# Patient Record
Sex: FEMALE | Race: BLACK OR AFRICAN AMERICAN | NOT HISPANIC OR LATINO | Employment: STUDENT | URBAN - METROPOLITAN AREA
[De-identification: names, ages, dates, MRNs, and addresses within clinical notes are randomized per-mention and may not be internally consistent; named-entity substitution may affect disease eponyms.]

---

## 2017-06-20 ENCOUNTER — HOSPITAL ENCOUNTER (EMERGENCY)
Facility: OTHER | Age: 22
Discharge: HOME | End: 2017-06-20
Attending: EMERGENCY MEDICINE
Payer: MEDICAID

## 2017-06-20 VITALS
WEIGHT: 197.06 LBS | HEART RATE: 89 BPM | DIASTOLIC BLOOD PRESSURE: 75 MMHG | SYSTOLIC BLOOD PRESSURE: 123 MMHG | RESPIRATION RATE: 20 BRPM | OXYGEN SATURATION: 95 % | BODY MASS INDEX: 36.26 KG/M2 | HEIGHT: 62 IN | TEMPERATURE: 98 F

## 2017-06-20 DIAGNOSIS — H61.21 HEARING LOSS OF RIGHT EAR DUE TO CERUMEN IMPACTION: Primary | ICD-10-CM

## 2017-06-20 LAB
B-HCG UR QL: NEGATIVE
CTP QC/QA: YES

## 2017-06-20 PROCEDURE — 81025 URINE PREGNANCY TEST: CPT | Performed by: EMERGENCY MEDICINE

## 2017-06-20 PROCEDURE — 99283 EMERGENCY DEPT VISIT LOW MDM: CPT | Mod: 25

## 2017-06-20 NOTE — ED TRIAGE NOTES
Pt presents to ED with c/o right ear pain x 2 weeks, reports pain radiating to cheek now. Pt denies fever/chills at home. No other complaints. Pt AAO x3.

## 2017-06-20 NOTE — ED PROVIDER NOTES
"Encounter Date: 6/20/2017    SCRIBE #1 NOTE: I, Montse Harrell, am scribing for, and in the presence of,  Dr. Mcmillan. I have scribed the entire note.       History     Chief Complaint   Patient presents with    Otalgia     stopped up x 2 wks, can't unstop it anymore by putting her finger in it     Review of patient's allergies indicates:   Allergen Reactions    Shellfish containing products Hives     Time seen by provider: 1:23 AM    This is a 22 y.o. female who presents with complaint of constant R ear pain that began 1 week ago. She states "it feels stopped up." She notes associated R sided dental discomfort. She has applied an earwax removal cream with no improvement of symptoms. She denies recent history of ear infections.       The history is provided by the patient.     History reviewed. No pertinent past medical history.  History reviewed. No pertinent surgical history.  History reviewed. No pertinent family history.  Social History   Substance Use Topics    Smoking status: Current Every Day Smoker     Packs/day: 0.50     Types: Cigarettes    Smokeless tobacco: Never Used    Alcohol use Yes      Comment: socially     Review of Systems   Constitutional: Negative for diaphoresis and fever.   HENT: Positive for ear pain (R). Negative for congestion and sore throat.         R sided dental discomfort.   Eyes: Negative for pain.   Respiratory: Negative for cough and shortness of breath.    Cardiovascular: Negative for chest pain and leg swelling.   Gastrointestinal: Negative for abdominal pain, constipation, diarrhea, nausea and vomiting.   Genitourinary: Negative for dysuria and hematuria.   Musculoskeletal: Negative for myalgias.   Skin: Negative for color change and rash.   Neurological: Negative for syncope and headaches.   Psychiatric/Behavioral: Negative for behavioral problems and confusion.       Physical Exam     Initial Vitals [06/20/17 0110]   BP Pulse Resp Temp SpO2   123/75 89 20 98.2 °F (36.8 °C) 95 " %     Physical Exam    Nursing note and vitals reviewed.  Constitutional: She appears well-developed and well-nourished. She is not diaphoretic. No distress.   HENT:   Head: Normocephalic and atraumatic.   Mouth/Throat: Oropharynx is clear and moist.   R ear: Cerumen impaction.  L ear: Normal.  No visible dental abscess or caries.    Eyes: Conjunctivae are normal. Pupils are equal, round, and reactive to light.   Neck: Normal range of motion. Neck supple.   Cardiovascular: Normal rate, regular rhythm and normal heart sounds. Exam reveals no gallop and no friction rub.    No murmur heard.  Pulmonary/Chest: Breath sounds normal. No respiratory distress. She has no wheezes. She has no rhonchi. She has no rales.   Abdominal: Soft. Bowel sounds are normal. There is no tenderness.   Musculoskeletal: Normal range of motion. She exhibits no edema or tenderness.   Neurological: She is alert and oriented to person, place, and time. She has normal strength. No sensory deficit.   Skin: Skin is warm and dry.   Psychiatric: She has a normal mood and affect. Her behavior is normal.         ED Course   Procedures  Labs Reviewed   POCT URINE PREGNANCY     Imaging Results    None                 Medical Decision Making:   Clinical Tests:   Lab Tests: Ordered and Reviewed    Additional MDM:   Comments: 22-year-old female presents complaining of decreased hearing right.  On exam she has remained impaction.  Patient was given the option of having it cleaned in the emergency department versus following up with ENT.  She chose to follow-up in clinic to have it cleaned.  She was given information to ENT and discharged in stable condition..          Scribe Attestation:   Scribe #1: I performed the above scribed service and the documentation accurately describes the services I performed. I attest to the accuracy of the note.    Attending Attestation:           Physician Attestation for Scribe:  Physician Attestation Statement for Scribe #1:  I, Dr. Mcmillan, reviewed documentation, as scribed by Montse Harrell in my presence, and it is both accurate and complete.                 ED Course     Clinical Impression:     1. Hearing loss of right ear due to cerumen impaction              Malu Mcmillan MD  06/20/17 0259

## 2017-06-29 ENCOUNTER — HOSPITAL ENCOUNTER (EMERGENCY)
Facility: OTHER | Age: 22
Discharge: HOME | End: 2017-06-29
Attending: EMERGENCY MEDICINE
Payer: MEDICARE

## 2017-06-29 VITALS
RESPIRATION RATE: 14 BRPM | DIASTOLIC BLOOD PRESSURE: 84 MMHG | HEART RATE: 76 BPM | OXYGEN SATURATION: 99 % | TEMPERATURE: 99 F | SYSTOLIC BLOOD PRESSURE: 124 MMHG | WEIGHT: 190 LBS | HEIGHT: 62 IN | BODY MASS INDEX: 34.96 KG/M2

## 2017-06-29 DIAGNOSIS — N39.0 URINARY TRACT INFECTION WITH HEMATURIA, SITE UNSPECIFIED: Primary | ICD-10-CM

## 2017-06-29 DIAGNOSIS — J06.9 VIRAL URI WITH COUGH: ICD-10-CM

## 2017-06-29 DIAGNOSIS — R31.9 URINARY TRACT INFECTION WITH HEMATURIA, SITE UNSPECIFIED: Primary | ICD-10-CM

## 2017-06-29 LAB
B-HCG UR QL: NEGATIVE
BACTERIA #/AREA URNS HPF: ABNORMAL /HPF
BILIRUB UR QL STRIP: NEGATIVE
CLARITY UR: ABNORMAL
COLOR UR: YELLOW
CTP QC/QA: YES
GLUCOSE UR QL STRIP: NEGATIVE
HGB UR QL STRIP: ABNORMAL
KETONES UR QL STRIP: NEGATIVE
LEUKOCYTE ESTERASE UR QL STRIP: ABNORMAL
MICROSCOPIC COMMENT: ABNORMAL
NITRITE UR QL STRIP: NEGATIVE
PH UR STRIP: 6 [PH] (ref 5–8)
PROT UR QL STRIP: NEGATIVE
RBC #/AREA URNS HPF: 20 /HPF (ref 0–4)
SP GR UR STRIP: 1.02 (ref 1–1.03)
SQUAMOUS #/AREA URNS HPF: 2 /HPF
URN SPEC COLLECT METH UR: ABNORMAL
UROBILINOGEN UR STRIP-ACNC: NEGATIVE EU/DL
WBC #/AREA URNS HPF: >100 /HPF (ref 0–5)

## 2017-06-29 PROCEDURE — 25000003 PHARM REV CODE 250: Performed by: EMERGENCY MEDICINE

## 2017-06-29 PROCEDURE — 81025 URINE PREGNANCY TEST: CPT | Performed by: EMERGENCY MEDICINE

## 2017-06-29 PROCEDURE — 81000 URINALYSIS NONAUTO W/SCOPE: CPT

## 2017-06-29 PROCEDURE — 99283 EMERGENCY DEPT VISIT LOW MDM: CPT

## 2017-06-29 RX ORDER — SULFAMETHOXAZOLE AND TRIMETHOPRIM 800; 160 MG/1; MG/1
1 TABLET ORAL
Status: COMPLETED | OUTPATIENT
Start: 2017-06-29 | End: 2017-06-29

## 2017-06-29 RX ORDER — SULFAMETHOXAZOLE AND TRIMETHOPRIM 800; 160 MG/1; MG/1
1 TABLET ORAL 2 TIMES DAILY
Qty: 6 TABLET | Refills: 0 | Status: SHIPPED | OUTPATIENT
Start: 2017-06-29 | End: 2017-07-02

## 2017-06-29 RX ADMIN — SULFAMETHOXAZOLE AND TRIMETHOPRIM 1 TABLET: 800; 160 TABLET ORAL at 08:06

## 2017-06-30 NOTE — ED PROVIDER NOTES
"Encounter Date: 6/29/2017    SCRIBE #1 NOTE: I, Obi Garcia, am scribing for, and in the presence of,  Dr. Topete. I have scribed the entire note.       History     Chief Complaint   Patient presents with    Dysuria     Patient c/o dysuria for 1 week.  Patient denies vaginal issues, abd pain, flank pain.      Time seen by provider: 7:19 PM    This is a 22 y.o. female who presents with complaint of "tingling" after urinating which started 1 week ago. She notes accompanying urinary frequency and urgency. She has had "a cold" with congestion, cough and HA. She denies abdominal pain, back pain, nausea, vomiting, fever, vaginal bleeding and vaginal discharge. She denies smoking and drug use but admits to occasional drinking.      The history is provided by the patient.     Review of patient's allergies indicates:   Allergen Reactions    Shellfish containing products Hives     History reviewed. No pertinent past medical history.  History reviewed. No pertinent surgical history.  History reviewed. No pertinent family history.  Social History   Substance Use Topics    Smoking status: Current Every Day Smoker     Packs/day: 0.50     Types: Cigarettes    Smokeless tobacco: Never Used    Alcohol use Yes      Comment: socially     Review of Systems   Constitutional: Negative for chills, diaphoresis and fever.   HENT: Positive for congestion. Negative for sore throat.    Eyes: Negative for pain.   Respiratory: Positive for cough. Negative for shortness of breath.    Cardiovascular: Negative for chest pain.   Gastrointestinal: Negative for diarrhea, nausea and vomiting.   Genitourinary: Positive for difficulty urinating, frequency and urgency.        "tingling" after urination.    Musculoskeletal: Negative for back pain and myalgias.   Skin: Negative for color change.   Neurological: Positive for headaches. Negative for light-headedness.   Psychiatric/Behavioral: Negative for behavioral problems and confusion. "       Physical Exam     Initial Vitals [06/29/17 1846]   BP Pulse Resp Temp SpO2   131/74 94 16 98.6 °F (37 °C) 96 %      MAP       93         Physical Exam    Nursing note and vitals reviewed.  Constitutional: She appears well-developed and well-nourished. She is not diaphoretic. No distress.   HENT:   Head: Normocephalic and atraumatic.   Mouth/Throat: Oropharynx is clear and moist.   Eyes: Conjunctivae are normal. Pupils are equal, round, and reactive to light. Right eye exhibits no discharge. Left eye exhibits no discharge.   Neck: Normal range of motion. Neck supple.   Cardiovascular: Normal rate, regular rhythm, normal heart sounds and intact distal pulses. Exam reveals no gallop and no friction rub.    No murmur heard.  Pulmonary/Chest: Breath sounds normal. No respiratory distress. She has no wheezes. She has no rhonchi. She has no rales.   Abdominal: Soft. Bowel sounds are normal. She exhibits no distension. There is no tenderness. There is no rebound, no guarding and no CVA tenderness.   Musculoskeletal: Normal range of motion. She exhibits no edema or tenderness.   Neurological: She is alert and oriented to person, place, and time. She has normal strength. No sensory deficit.   Skin: Skin is warm and dry. No rash and no abscess noted. No erythema. No pallor.   Psychiatric: She has a normal mood and affect. Her behavior is normal. Judgment and thought content normal.         ED Course   Procedures  Labs Reviewed   URINALYSIS - Abnormal; Notable for the following:        Result Value    Appearance, UA Hazy (*)     Occult Blood UA 1+ (*)     Leukocytes, UA 3+ (*)     All other components within normal limits   URINALYSIS MICROSCOPIC - Abnormal; Notable for the following:     RBC, UA 20 (*)     WBC, UA >100 (*)     Bacteria, UA Few (*)     All other components within normal limits   POCT URINE PREGNANCY             Medical Decision Making:   Clinical Tests:   Lab Tests: Ordered and Reviewed  ED  Management:  Urgent evaluation a 22-year-old female with complaint of urinary symptoms times days.  Vital signs are benign, afebrile.  There is no CVA or abdominal tenderness, I do not suspect pyelonephritis.  Urinalysis is consistent with UTI.  She will be treated with three-day course of Bactrim, given first dose here.  Additionally, she complained of URI type symptoms with cough.  There is no associated abnormal vital sign, chest pain or shortness of breath and I do not suspect pneumonia or think imaging is warranted.  She was discharged in good condition and encouraged close follow-up with PCP or return for new or worsening symptoms.            Scribe Attestation:   Scribe #1: I performed the above scribed service and the documentation accurately describes the services I performed. I attest to the accuracy of the note.    Attending Attestation:           Physician Attestation for Scribe:  Physician Attestation Statement for Scribe #1: I, Dr. Topete, reviewed documentation, as scribed by Obi Garcia in my presence, and it is both accurate and complete.                 ED Course     Clinical Impression:     1. Urinary tract infection with hematuria, site unspecified    2. Viral URI with cough                                Beth Topete MD  06/30/17 7588

## 2017-06-30 NOTE — ED NOTES
Pt presents to the ED with c/o dysuria x1 week, pt reports she has had UTI in the past and feels like this is similar in symptoms. Pt appears non toxic and in no signs of acute distress.

## 2017-08-13 ENCOUNTER — HOSPITAL ENCOUNTER (EMERGENCY)
Facility: OTHER | Age: 22
Discharge: HOME | End: 2017-08-13
Attending: EMERGENCY MEDICINE
Payer: MEDICAID

## 2017-08-13 VITALS
TEMPERATURE: 98 F | SYSTOLIC BLOOD PRESSURE: 145 MMHG | BODY MASS INDEX: 34.96 KG/M2 | DIASTOLIC BLOOD PRESSURE: 90 MMHG | WEIGHT: 190 LBS | RESPIRATION RATE: 18 BRPM | HEART RATE: 91 BPM | OXYGEN SATURATION: 100 % | HEIGHT: 62 IN

## 2017-08-13 DIAGNOSIS — A59.9 TRICHOMONAS VAGINALIS INFECTION: ICD-10-CM

## 2017-08-13 DIAGNOSIS — N76.0 BACTERIAL VAGINOSIS: Primary | ICD-10-CM

## 2017-08-13 DIAGNOSIS — B96.89 BACTERIAL VAGINOSIS: Primary | ICD-10-CM

## 2017-08-13 LAB
B-HCG UR QL: NEGATIVE
BACTERIA GENITAL QL WET PREP: ABNORMAL
BILIRUB UR QL STRIP: NEGATIVE
CLARITY UR: CLEAR
CLUE CELLS VAG QL WET PREP: ABNORMAL
COLOR UR: YELLOW
CTP QC/QA: YES
FILAMENT FUNGI VAG WET PREP-#/AREA: ABNORMAL
GLUCOSE UR QL STRIP: NEGATIVE
HGB UR QL STRIP: NEGATIVE
KETONES UR QL STRIP: ABNORMAL
LEUKOCYTE ESTERASE UR QL STRIP: NEGATIVE
NITRITE UR QL STRIP: NEGATIVE
PH UR STRIP: 7 [PH] (ref 5–8)
PROT UR QL STRIP: ABNORMAL
SP GR UR STRIP: 1.02 (ref 1–1.03)
SPECIMEN SOURCE: ABNORMAL
T VAGINALIS GENITAL QL WET PREP: ABNORMAL
URN SPEC COLLECT METH UR: ABNORMAL
UROBILINOGEN UR STRIP-ACNC: NEGATIVE EU/DL
WBC #/AREA VAG WET PREP: ABNORMAL
YEAST GENITAL QL WET PREP: ABNORMAL

## 2017-08-13 PROCEDURE — 99284 EMERGENCY DEPT VISIT MOD MDM: CPT | Mod: 25

## 2017-08-13 PROCEDURE — 81003 URINALYSIS AUTO W/O SCOPE: CPT

## 2017-08-13 PROCEDURE — 87210 SMEAR WET MOUNT SALINE/INK: CPT

## 2017-08-13 PROCEDURE — 81025 URINE PREGNANCY TEST: CPT | Performed by: EMERGENCY MEDICINE

## 2017-08-13 RX ORDER — METRONIDAZOLE 500 MG/1
500 TABLET ORAL EVERY 12 HOURS
Qty: 14 TABLET | Refills: 0 | Status: SHIPPED | OUTPATIENT
Start: 2017-08-13 | End: 2017-08-20

## 2017-08-13 NOTE — DISCHARGE INSTRUCTIONS
Do not drink alcohol for 10 days. Alcohol will interfere with your antibiotics and make you feel flushed, dizzy, and very sick.

## 2017-08-13 NOTE — ED PROVIDER NOTES
"Encounter Date: 8/13/2017    SCRIBE #1 NOTE: I, Joe Rain, am scribing for, and in the presence of,  Dr. Mejia. I have scribed the entire note.       History     Chief Complaint   Patient presents with    Vaginal Itching     pt reports using new soap with vaginal itching and burning with urination after. symptoms started 4 days ago. denies any discharge     Time seen by provider: 5:45 PM    This is a 22 y.o. female who presents with complaint of vaginal discharge for four days. Pt reports using a new soap to "dry out her skin", which she believes causes vaginal itch and burning. She denies any associated vaginal discharge, vaginal bleeding, malodorous urine, or N/V/D. No fever. She has since discontinued use of the soap; she is now using Dove. She has no additional complaints.     Additional past medical, surgical, and social history as outlined in the nursing assessment was reviewed by me.        The history is provided by the patient and medical records.     Review of patient's allergies indicates:   Allergen Reactions    Shellfish containing products Hives     History reviewed. No pertinent past medical history.  History reviewed. No pertinent surgical history.  History reviewed. No pertinent family history.  Social History   Substance Use Topics    Smoking status: Current Every Day Smoker     Packs/day: 0.50     Types: Cigarettes    Smokeless tobacco: Never Used    Alcohol use Yes      Comment: socially     Review of Systems   Constitutional: Negative for chills and fever.   Respiratory: Negative for shortness of breath.    Gastrointestinal: Negative for abdominal pain, nausea and vomiting.   Genitourinary: Negative for decreased urine volume, dysuria, pelvic pain, vaginal bleeding and vaginal discharge.        Vaginal itch   Musculoskeletal: Negative for back pain.   Skin: Negative for rash.   Allergic/Immunologic: Negative for immunocompromised state.   Neurological: Negative for tremors, syncope, " weakness and headaches.   Hematological: Does not bruise/bleed easily.   Psychiatric/Behavioral: Negative for confusion.   All other systems reviewed and are negative.      Physical Exam     Initial Vitals [08/13/17 1620]   BP Pulse Resp Temp SpO2   (!) 145/90 91 18 98.3 °F (36.8 °C) 100 %      MAP       108.33         Physical Exam    Nursing note and vitals reviewed.  Constitutional: She appears well-developed and well-nourished. She is not diaphoretic. No distress.   HENT:   Head: Normocephalic and atraumatic.   Right Ear: External ear normal.   Left Ear: External ear normal.   Eyes: Conjunctivae and EOM are normal. Right eye exhibits no discharge. Left eye exhibits no discharge.   Neck: Normal range of motion. Neck supple. No tracheal deviation present.   Cardiovascular: Normal rate, regular rhythm, normal heart sounds and intact distal pulses. Exam reveals no gallop and no friction rub.    No murmur heard.  Pulmonary/Chest: Breath sounds normal. No stridor. No respiratory distress. She has no wheezes. She has no rhonchi. She has no rales.   Abdominal: Soft. Bowel sounds are normal. She exhibits no distension. There is no tenderness. There is no rebound and no guarding.   Genitourinary:   Genitourinary Comments: normal exernal genitalia. Clear white thin vaginal discharge. Cervix nonfriable. Bimanual exam was deferred.   Musculoskeletal: Normal range of motion. She exhibits no edema or tenderness.   Neurological: She is alert and oriented to person, place, and time. She has normal strength. No cranial nerve deficit.   Skin: Skin is warm and dry. Capillary refill takes less than 2 seconds. No erythema. No pallor.   Psychiatric: She has a normal mood and affect. Her behavior is normal.         ED Course   Procedures  Labs Reviewed   URINALYSIS - Abnormal; Notable for the following:        Result Value    Protein, UA Trace (*)     Ketones, UA Trace (*)     All other components within normal limits   VAGINAL SCREEN  - Abnormal; Notable for the following:     Trichomonas Rare (*)     Clue Cells, Wet Prep Occasional (*)     WBC - Vaginal Screen Rare (*)     Bacteria - Vaginal Screen Moderate (*)     All other components within normal limits   POCT URINE PREGNANCY - Normal             Medical Decision Making:   Initial Assessment:   Pt presents with vaginal itching. She notes having STD testing recently that was normal. I will send a vag screen today. I will reassess.  ED Management:  18:40 - Vag screen reveals the presence of BV and Trichomonas. I will treat with Flagyl for both. I have discussed with patient the diagnostic results, diagnosis, treatment plan, and need for follow-up. Patient has expressed understanding of my instructions. I am comfortable with her discharge home at this time.              Scribe Attestation:   Scribe #1: I performed the above scribed service and the documentation accurately describes the services I performed. I attest to the accuracy of the note.    Attending Attestation:           Physician Attestation for Scribe:  Physician Attestation Statement for Scribe #1: I, Dr. Mejia, reviewed documentation, as scribed by Joe Rain in my presence, and it is both accurate and complete.                 ED Course     Clinical Impression:   The primary encounter diagnosis was Bacterial vaginosis. A diagnosis of Trichomonas vaginalis infection was also pertinent to this visit.                           Emilia Mejia MD  08/15/17 7895

## 2017-08-13 NOTE — ED TRIAGE NOTES
Pt reports to ED c/o vaginal itching/burning after changing to ivory soap approx 1 week ago. Pt denies redness/swelling or vaginal discharge. Denies fevers, chills, dysuria/hematuria. AAO x 4.

## 2025-04-29 ENCOUNTER — HOSPITAL ENCOUNTER (EMERGENCY)
Facility: OTHER | Age: 30
Discharge: LEFT BEFORE DISCHARGE | End: 2025-04-29
Payer: MEDICARE

## 2025-04-29 VITALS
DIASTOLIC BLOOD PRESSURE: 98 MMHG | SYSTOLIC BLOOD PRESSURE: 176 MMHG | WEIGHT: 202 LBS | HEIGHT: 62 IN | TEMPERATURE: 98 F | OXYGEN SATURATION: 100 % | BODY MASS INDEX: 37.17 KG/M2 | RESPIRATION RATE: 18 BRPM | HEART RATE: 98 BPM

## 2025-04-29 PROCEDURE — 99900041 HC LEFT WITHOUT BEING SEEN- EMERGENCY

## 2025-04-30 NOTE — ED NOTES
While walking to room pt became offended when a staff member commented on how cute her son was. Pt states she no longer wants to be seen and walked out.